# Patient Record
Sex: FEMALE | Race: BLACK OR AFRICAN AMERICAN | Employment: OTHER | URBAN - METROPOLITAN AREA
[De-identification: names, ages, dates, MRNs, and addresses within clinical notes are randomized per-mention and may not be internally consistent; named-entity substitution may affect disease eponyms.]

---

## 2024-07-09 DIAGNOSIS — M25.562 LEFT KNEE PAIN, UNSPECIFIED CHRONICITY: Primary | ICD-10-CM

## 2024-08-29 ENCOUNTER — OFFICE VISIT (OUTPATIENT)
Age: 74
End: 2024-08-29

## 2024-08-29 ENCOUNTER — HOSPITAL ENCOUNTER (OUTPATIENT)
Age: 74
Discharge: HOME | End: 2024-08-29
Payer: MEDICARE

## 2024-08-29 VITALS — BODY MASS INDEX: 38.71 KG/M2 | WEIGHT: 192 LBS | HEIGHT: 59 IN

## 2024-08-29 DIAGNOSIS — M25.562 LEFT KNEE PAIN, UNSPECIFIED CHRONICITY: ICD-10-CM

## 2024-08-29 DIAGNOSIS — M17.12 PRIMARY OSTEOARTHRITIS OF LEFT KNEE: Primary | ICD-10-CM

## 2024-08-29 PROCEDURE — 73564 X-RAY EXAM KNEE 4 OR MORE: CPT

## 2024-08-29 RX ORDER — LISINOPRIL 40 MG/1
TABLET ORAL
COMMUNITY
Start: 2012-04-05

## 2024-08-29 RX ORDER — POTASSIUM CHLORIDE 750 MG/1
TABLET, EXTENDED RELEASE ORAL
COMMUNITY
Start: 2024-08-26

## 2024-08-29 RX ORDER — ATORVASTATIN CALCIUM 10 MG/1
TABLET, FILM COATED ORAL
COMMUNITY

## 2024-08-29 RX ORDER — BUMETANIDE 1 MG/1
TABLET ORAL
COMMUNITY
Start: 2024-08-09

## 2024-08-29 RX ORDER — CARVEDILOL 25 MG/1
TABLET ORAL
COMMUNITY
Start: 2012-04-05

## 2024-08-29 RX ORDER — TRAMADOL HYDROCHLORIDE 50 MG/1
TABLET ORAL
COMMUNITY

## 2024-08-29 RX ORDER — DAPAGLIFLOZIN 10 MG/1
TABLET, FILM COATED ORAL
COMMUNITY

## 2024-08-29 RX ORDER — TADALAFIL 20 MG/1
TABLET ORAL
COMMUNITY
Start: 2023-11-13

## 2024-08-29 NOTE — PATIENT INSTRUCTIONS
Knee Arthritis: Care Instructions  Overview     Knee arthritis is a breakdown of the cartilage that cushions your knee joint. When the cartilage wears down, your bones rub against each other. This causes pain and stiffness. Knee arthritis tends to get worse with time.  Treatment for knee arthritis involves reducing pain, making the leg muscles stronger, and staying at a healthy body weight. The treatment usually does not improve the health of the cartilage, but it can reduce pain and improve how well your knee works.  You can take simple measures to protect your knee joints, ease your pain, and help you stay active.  Follow-up care is a key part of your treatment and safety. Be sure to make and go to all appointments, and call your doctor if you are having problems. It's also a good idea to know your test results and keep a list of the medicines you take.  How can you care for yourself at home?  Know that knee arthritis will cause more pain on some days than on others.  Stay at a healthy weight. Lose weight if you are overweight. When you stand up, the pressure on your knees from every pound of body weight is multiplied four times. So if you lose 10 pounds, you will reduce the pressure on your knees by 40 pounds.  Talk to your doctor or physical therapist about exercises that will help ease joint pain.  Stretch to help prevent stiffness and to prevent injury before you exercise. You may enjoy gentle forms of yoga to help keep your knee joints and muscles flexible.  Walk instead of jog.  Ride a bike. This makes your thigh muscles stronger and takes pressure off your knee.  Wear well-fitting and comfortable shoes.  Exercise in chest-deep water. This can help you exercise longer with less pain.  Avoid exercises that include squatting or kneeling. They can put a lot of strain on your knees.  Talk to your doctor to make sure that the exercise you do is not making the arthritis worse.  Do not sit for long periods of time.  more comfortable.  Tighten the thigh muscles in your affected leg by pressing the back of your knee down. Hold your knee straight.  Keeping the thigh muscles tight and your leg straight, lift your affected leg up so that your heel is about 12 inches off the floor. Hold for about 6 seconds, then lower slowly.  Repeat 8 to 12 times.  It's a good idea to repeat these steps with your other leg.  Active knee flexion    Lie on your stomach with your knees straight. If your kneecap is uncomfortable, roll up a washcloth and put it under your leg just above your kneecap.  Lift the foot of your affected leg by bending the knee so that you bring the foot up toward your buttock. If this motion hurts, try it without bending your knee quite as far. This may help you avoid any painful motion.  Slowly move your leg up and down.  Repeat 8 to 12 times.  Switch legs and repeat steps 1 through 4, even if only one knee is sore.  Quadriceps stretch (facedown)    Lie flat on your stomach, and rest your face on the floor.  Wrap a towel or belt strap around the lower part of your affected leg. Then use the towel or belt strap to slowly pull your heel toward your buttock until you feel a stretch.  Hold for about 15 to 30 seconds, then relax your leg against the towel or belt strap.  Repeat 2 to 4 times.  Switch legs and repeat steps 1 through 4, even if only one knee is sore.  Stationary exercise bike    If you do not have a stationary exercise bike at home, you can find one to ride at your local health club or community center.  Adjust the height of the bike seat so that your knee is slightly bent when your leg is extended downward. If your knee hurts when the pedal reaches the top, you can raise the seat so that your knee does not bend as much.  Start slowly. At first, try to do 5 to 10 minutes of cycling with little to no resistance. Then increase your time and the resistance bit by bit until you can do 20 to 30 minutes without pain.  If

## 2024-08-29 NOTE — PROGRESS NOTES
Name: Megha Smith    : 1950     [facility]  [address], [address]  [facility] [address]  Dept: [phone]  Dept Fax: [phone]     Chief Complaint   Patient presents with    Knee Pain     Left        Ht 1.499 m (4' 11\")   Wt 87.1 kg (192 lb)   BMI 38.78 kg/m²      Allergies   Allergen Reactions    Latex      Other Reaction(s): Not available    Aspirin Other (See Comments)     Other Reaction(s): unknown    \"Blood clots while I was taking birth control pills. Dr told me to never take aspirin\"    Blood Clot    Shellfish Allergy Swelling    Penicillins Rash     Other Reaction(s): unknown        Current Outpatient Medications   Medication Sig Dispense Refill    bumetanide (BUMEX) 1 MG tablet       carvedilol (COREG) 25 MG tablet       lisinopril (PRINIVIL;ZESTRIL) 40 MG tablet       potassium chloride (KLOR-CON) 10 MEQ extended release tablet       Tadalafil, PAH, 20 MG tablet       atorvastatin (LIPITOR) 10 MG tablet       FARXIGA 10 MG tablet TAKE 1 TABLET BY MOUTH ONCE DAILY FOR CHRONIC HEART FAILURE      traMADol (ULTRAM) 50 MG tablet Take 1 tablet every 6 hours by oral route as needed.       No current facility-administered medications for this visit.       There is no problem list on file for this patient.     Family History   Problem Relation Age of Onset    Heart Disease Brother        Past Surgical History:   Procedure Laterality Date    KNEE SURGERY        Past Medical History:   Diagnosis Date    Cancer (HCC)     Chronic kidney disease     GERD (gastroesophageal reflux disease)     Heart disease     Hypercholesteremia     Hypertension     Osteoarthritis         I have reviewed and agree with PFS and ROS and intake form in chart and the record furthermore I have reviewed prior medical record(s) regarding this patients care during this appointment.     Review of Systems:   Patient is a pleasant  appearing individual, appropriately dressed, well hydrated, well nourished, who is alert, appropriately oriented for age, and in no acute distress with a normal gait and normal affect who does not appear to be in any significant pain.    Physical Exam:      Right Knee - Full Range of Motion, No crepitation, Grossly neurovascularly intact, Good cap refill, No skin lesion, No swelling, No gross instability, No quadriceps weakness    Encounter Diagnosis   Name Primary?    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      HPI:  The patient is here with a chief complaint of left knee pain, throbbing, burning pain.    X-rays of the left knee are positive for severe OA with disproportionate arthritis, dysplastic patella.    Assessment/Plan:  Plan at this point with the significant amount of instability  she has I would recommend a new brace to help her stabilize the knee her current brace is worn and not in good shape.  We will contact [facility] in [place] to get that set up and go from there. Continue bracing in the meantime.     As part of continued conservative pain management options the patient was advised to utilize Tylenol or OTC NSAIDS as long as it is not medically contraindicated.     Return to Office:    Follow-up and Dispositions    Return if symptoms worsen or fail to improve.        Scribed by Brent Parnell MD as dictated by Brent Parnell MD.  Documentation, performed by, True and Accepted Brent Parnell MD

## 2024-09-03 DIAGNOSIS — M23.52 RECURRENT LEFT KNEE INSTABILITY: ICD-10-CM

## 2024-09-03 DIAGNOSIS — M17.12 PRIMARY OSTEOARTHRITIS OF LEFT KNEE: Primary | ICD-10-CM
